# Patient Record
Sex: MALE | Race: WHITE | NOT HISPANIC OR LATINO | Employment: FULL TIME | URBAN - NONMETROPOLITAN AREA
[De-identification: names, ages, dates, MRNs, and addresses within clinical notes are randomized per-mention and may not be internally consistent; named-entity substitution may affect disease eponyms.]

---

## 2017-06-19 ENCOUNTER — OFFICE VISIT (OUTPATIENT)
Dept: SURGERY | Facility: CLINIC | Age: 27
End: 2017-06-19

## 2017-06-19 VITALS
HEART RATE: 87 BPM | HEIGHT: 76 IN | SYSTOLIC BLOOD PRESSURE: 170 MMHG | TEMPERATURE: 98.2 F | DIASTOLIC BLOOD PRESSURE: 90 MMHG | OXYGEN SATURATION: 98 % | WEIGHT: 270 LBS | BODY MASS INDEX: 32.88 KG/M2

## 2017-06-19 DIAGNOSIS — K82.8 BILIARY DYSKINESIA: Primary | ICD-10-CM

## 2017-06-19 PROCEDURE — 99244 OFF/OP CNSLTJ NEW/EST MOD 40: CPT | Performed by: SURGERY

## 2017-06-19 NOTE — PROGRESS NOTES
Patient: Kumar Valera    YOB: 1990    Date: 06/19/2017    Primary Care Provider: Jeramy Poe MD    Reason for Consultation: Cholecystitis    Chief complaint:   Chief Complaint   Patient presents with   • Abdominal Pain     Abnormal HIDA scan 4/26/17 showing a 4% ejection fraction.       Subjective .     History of present illness:  I saw the patient in the office today as a consultation for evaluation and treatment of Abdominal pain.  Patient states that he has had right upper quadrant pain intermittently but does not radiate.  On an isolated up and it was quite sharp and severe.  Otherwise it is mild and sometimes constant rate eating makes it worse.  This has occurred over the last year.  He has associated nausea and occasionally has had vomiting but none recently.    Review of Systems   Constitutional: Negative for chills, fever and unexpected weight change.   HENT: Negative for trouble swallowing and voice change.    Eyes: Negative for visual disturbance.   Respiratory: Negative for apnea, cough, chest tightness, shortness of breath and wheezing.    Cardiovascular: Negative for chest pain, palpitations and leg swelling.   Gastrointestinal: Positive for abdominal pain and nausea. Negative for abdominal distention, anal bleeding, blood in stool, constipation, diarrhea, rectal pain and vomiting.   Endocrine: Negative for cold intolerance and heat intolerance.   Genitourinary: Negative for difficulty urinating, dysuria, flank pain, scrotal swelling and testicular pain.   Musculoskeletal: Negative for back pain, gait problem and joint swelling.   Skin: Negative for color change, rash and wound.   Neurological: Positive for numbness and headaches. Negative for dizziness, syncope, speech difficulty and weakness.   Hematological: Negative for adenopathy. Does not bruise/bleed easily.   Psychiatric/Behavioral: Negative for confusion. The patient is not nervous/anxious.        History:  History  reviewed. No pertinent past medical history.    No past surgical history on file.    Family History   Problem Relation Age of Onset   • Hypertension Mother    • Hypertension Father    • Cancer Maternal Grandmother    • Cancer Maternal Grandfather    • Diabetes Paternal Grandmother        Social History   Substance Use Topics   • Smoking status: Current Every Day Smoker   • Smokeless tobacco: None   • Alcohol use No       Allergies:  Allergies no known allergies    Medications:  No current outpatient prescriptions on file.    Objective     Vital Signs:   Temp:  [98.2 °F (36.8 °C)] 98.2 °F (36.8 °C)  Heart Rate:  [87] 87  BP: (170)/(90) 170/90    Physical Exam:   General Appearance:    Alert, cooperative, in no acute distress   Head:    Normocephalic, without obvious abnormality, atraumatic   Eyes:            Lids and lashes normal, conjunctivae and sclerae normal, no   icterus, no pallor, corneas clear, PERRLA   Ears:    Ears appear intact with no abnormalities noted   Throat:   No oral lesions, no thrush, oral mucosa moist   Lungs:     Clear to auscultation,respirations regular, even and                  unlabored    Heart:    Regular rhythm and normal rate, normal S1 and S2, no            murmur   Abdomen:     no masses, no organomegaly, soft non-tender, non-distended, no guarding, there is evidence of right upper quadrant tenderness   Extremities:   Moves all extremities well, no edema, no cyanosis, no             redness   Pulses:   Pulses palpable and equal bilaterally   Skin:   No bleeding, bruising or rash   Neurologic:   Cranial nerves 2 - 12 grossly intact, sensation intact   Psychiatric: No evidence of depression or anxiety      Results Review:   I reviewed the patient's new clinical results.  Labs and HIDA scan were reviewed    Assessment/Plan :    1. Biliary dyskinesia        I had a detailed and extensive discussion with the patient in the office And I discussed with him the results of his studies and  the possibility of the gallbladder being the etiology of his symptoms.  He clearly understands that in 10-15% of the time removing the gallbladder will not help in the situation and he understands this.  Full risks and benefits of operative versus nonoperative intervention were discussed with the patient and these included things such as nonresolution of symptoms and possible worsening of symptoms without surgical intervention versus infection, bleeding, open cholecystectomy, common bile duct injury, postoperative biliary leakage, need for drain placement, possible inability to perform cholangiography due to inflammation, postoperative abscess, etc with surgical intervention.  As far as further potential workup and EGD as well as medication could be considered but at this time he wishes to proceed with a cholecystectomy.  I did draw a picture of the anatomy for the patient and used this in my informed consent.         Electronically signed by Sedrick Cornejo MD  06/19/17

## 2017-07-05 ENCOUNTER — TELEPHONE (OUTPATIENT)
Dept: SURGERY | Facility: CLINIC | Age: 27
End: 2017-07-05

## 2017-07-05 NOTE — TELEPHONE ENCOUNTER
Pt n/s for lelo paige today at [facility]. He states he is sick but could not find our number to cancel. He does not wish to r/s at this time due to insurance changing soon. He says once he begins his new job and insurance is lined out, he will call back to reschedule. Thanks.

## 2024-07-17 ENCOUNTER — OFFICE VISIT (OUTPATIENT)
Dept: NEUROSURGERY | Facility: CLINIC | Age: 34
End: 2024-07-17
Payer: COMMERCIAL

## 2024-07-17 VITALS — HEIGHT: 76 IN | TEMPERATURE: 97.8 F | BODY MASS INDEX: 35.31 KG/M2 | WEIGHT: 290 LBS

## 2024-07-17 DIAGNOSIS — M51.36 DEGENERATIVE DISC DISEASE, LUMBAR: ICD-10-CM

## 2024-07-17 DIAGNOSIS — G89.29 CHRONIC MIDLINE LOW BACK PAIN WITH RIGHT-SIDED SCIATICA: Primary | ICD-10-CM

## 2024-07-17 DIAGNOSIS — M54.41 CHRONIC MIDLINE LOW BACK PAIN WITH RIGHT-SIDED SCIATICA: Primary | ICD-10-CM

## 2024-07-17 RX ORDER — METHOCARBAMOL 750 MG/1
TABLET, FILM COATED ORAL
COMMUNITY
Start: 2024-06-14

## 2024-07-17 RX ORDER — LISINOPRIL 10 MG/1
1 TABLET ORAL DAILY
COMMUNITY
Start: 2024-06-21

## 2024-07-17 RX ORDER — MELOXICAM 7.5 MG/1
7.5 TABLET ORAL 2 TIMES DAILY
COMMUNITY
Start: 2024-06-21

## 2024-07-17 NOTE — PROGRESS NOTES
Patient: Kumar Valera  : 1990  Chart #: [id number]    Date of Service: 2024    Chief Complaint: Low back pain, right lower extremity pain    HPI: Mr. Valera is a pleasant 33-year-old that works for an [employer].  He presents for evaluation of chronic (10+ years) low back pain with intermittent exacerbations.  He states that about 3 months ago he bent over to lift something, which created excruciating pain in the center of his back.  The pain will also travel into his right hip and anterolateral thigh and stops at his knee.  His back pain is worse than his right leg pain.  He has participated in physical therapy and found that while the therapy in and of itself is not difficult, when he goes home he is exceptionally sore.  He has found that wearing an over-the-counter back brace, using a TENS unit, and lying down has been the most helpful for his pain.  He also takes meloxicam and methocarbamol, which he finds to be helpful. He denies saddle anesthesia, incontinence, and left leg symptoms. He is a smoker.    Unfortunately, Mr. Valera confused his lumbar x-ray disc with his lumbar MRI disc at home and has only brought his x-ray for my review today.      History reviewed. No pertinent past medical history.      History reviewed. No pertinent surgical history.    No Known Allergies      Current Outpatient Medications:     lisinopril (PRINIVIL,ZESTRIL) 10 MG tablet, Take 1 tablet by mouth Daily., Disp: , Rfl:     meloxicam (MOBIC) 7.5 MG tablet, Take 1 tablet by mouth 2 (Two) Times a Day., Disp: , Rfl:     methocarbamol (ROBAXIN) 750 MG tablet, 1 tablet twice a day as needed, Disp: , Rfl:     Social History     Socioeconomic History    Marital status:    Tobacco Use    Smoking status: Every Day   Vaping Use    Vaping status: Never Used   Substance and Sexual Activity    Alcohol use: No    Drug use: No       Family History   Problem Relation Age of Onset    Hypertension Mother      Hypertension Father     Cancer Maternal Grandmother     Cancer Maternal Grandfather     Diabetes Paternal Grandmother        Class 2 Severe Obesity (BMI >=35 and <=39.9). Obesity-related health conditions include the following:  back pain . discussed portion control and increasing exercise.      Review of Systems   Constitutional:  Negative for activity change, appetite change, chills, diaphoresis, fatigue, fever and unexpected weight change.   HENT:  Negative for congestion, dental problem, drooling, ear discharge, ear pain, facial swelling, hearing loss, mouth sores, nosebleeds, postnasal drip, rhinorrhea, sinus pressure, sneezing, sore throat, tinnitus, trouble swallowing and voice change.    Eyes:  Negative for photophobia, pain, discharge, redness, itching and visual disturbance.   Respiratory:  Negative for apnea, cough, choking, chest tightness, shortness of breath, wheezing and stridor.    Cardiovascular:  Negative for chest pain, palpitations and leg swelling.   Gastrointestinal:  Negative for abdominal distention, abdominal pain, anal bleeding, blood in stool, constipation, diarrhea, nausea, rectal pain and vomiting.   Endocrine: Negative for cold intolerance, heat intolerance, polydipsia, polyphagia and polyuria.   Genitourinary:  Negative for decreased urine volume, difficulty urinating, dysuria, enuresis, flank pain, frequency, genital sores, hematuria, penile discharge, penile pain, penile swelling, scrotal swelling, testicular pain and urgency.   Musculoskeletal:  Positive for back pain. Negative for arthralgias, gait problem, joint swelling, myalgias, neck pain and neck stiffness.   Skin:  Negative for color change, pallor, rash and wound.   Allergic/Immunologic: Negative for environmental allergies, food allergies and immunocompromised state.   Neurological:  Positive for numbness. Negative for dizziness, tremors, seizures, syncope, facial asymmetry, speech difficulty, weakness, light-headedness and  "headaches.   Hematological:  Negative for adenopathy. Does not bruise/bleed easily.   Psychiatric/Behavioral:  Negative for agitation, behavioral problems, confusion, decreased concentration, dysphoric mood, hallucinations, self-injury, sleep disturbance and suicidal ideas. The patient is not nervous/anxious and is not hyperactive.    All other systems reviewed and are negative.       Physical examination:  Temperature 97.8 °F (36.6 °C), temperature source Temporal, height 193 cm (76\"), weight 132 kg (290 lb).    Constitutional: The patient is well-developed. He is overweight.  He is in no acute distress.    HEENT: normocephalic, atraumatic, sclera clear    Musculoskeletal:  Motor examination does not reveal weakness in the , upper or lower extremities.   Straight leg testing is mildly positive on the right leg.  Az signs are negative.  Minimal tenderness to palpation elicited in the midline spine or in the associated paravertebral muscles.  Full range of motion in the lumbar spine, slow to move through flexion.      Neurologic Exam  A/A/C, speech clear, attention normal, conversant, answers questions appropriately, good historian.  Sensation is intact to light touch testing; he does report some numbness in the right thigh and calf.  Gait is normal, balance is normal.   No tremors are noted.  Reflexes are intact, 1+ throughout.   [place]jada is negative. Clonus is negative.     Radiographic Imaging:  For my review plain films of the lumbar spine dated 1/11/2024 demonstrate mild degenerative changes.  Alignment is normal.  There are no fractures.    The patient's recent lumbar MRI is not available for my review.  The radiology report denotes mild disc desiccation with disc bulging at L3-4.  There is also an L4-5 right central disc herniation.  Spurring is noted.  There is facet ligament hypertrophy at L4-5 as well.  The radiologist does note severe narrowing of the right lateral recess which impinges on the L5 " nerve root.    Medical Decision Making  Diagnoses and all orders for this visit:    1. Chronic midline low back pain with right-sided sciatica (Primary)    2. Degenerative disc disease, lumbar    Mr. Valera primarily describes mechanical back pain.  I had a whit discussion with him that irrespective of what is on his MRI, a neurosurgical intervention will not likely fix all of his symptoms.  I discussed with him my concerns with him chronically wearing a back brace as I feel that can decondition his muscles even further which could lead to increased back problems further down the road. We discussed his smoking and weight management as those can both be things that increase the rate of deterioration and degenerative change in his spine.  He will follow-up with his MRI in several weeks and recommendations will be made at that time.    Any copied data from previous notes included in the (1) HPI, (2) PE, (3) MDM and/or assessment and plan has been reviewed and is accurate as of this day.    Claudia Hill PA-C     Patient Care Team:  Delmar Murry MD as PCP - General (Family Medicine)  Delmar Murry MD as Referring Physician (Family Medicine)  Gibran Wesley MD as Consulting Physician (Neurosurgery)  Delmar Murry MD as Primary Care Provider (Family Medicine)